# Patient Record
(demographics unavailable — no encounter records)

---

## 2025-06-09 NOTE — ASSESSMENT
[FreeTextEntry1] : Patient has bilateral middle finger trigger digits.  The left side is worse on the right side.  He will end up having surgery done for a left middle finger trigger digit release.  Risk and benefits of surgery got limited to bleeding infection risk injury and stiffness discussed with the patient.  The fact that he has a PIP flexion contracture already was discussed with him as well.  He will see me back at the time of surgical intervention under local anesthesia

## 2025-06-09 NOTE — PHYSICAL EXAM
[de-identified] : Patient has tenderness to palpation A1 pulley left middle finger and right middle finger.  He has a flexion contracture of the PIP joint of the left middle finger.  Less so with the right middle finger.  There is no Dupuytren's present.  No erythema ecchymoses or abrasions.  No masses.

## 2025-06-09 NOTE — HISTORY OF PRESENT ILLNESS
[de-identified] : 78-year-old male comes in history of diabetes has both middle fingers that bother him with pain discomfort and locking.  He has not had a cortisone injections in the past that did not relieve the symptoms.  And he comes in today for evaluation because that is locking that is present.  The left bothers him more than the right.  The patient was having pain and discomfort in his hand that is worse over the last couple of weeks

## 2025-07-02 NOTE — PHYSICAL EXAM
[de-identified] : Physical exam of his his left middle:  Resolving swelling and ecchymosis.  The wound is clean and dry.  No signs of drainage, pus or infection. Stiffness of the 3rd digit.

## 2025-07-02 NOTE — HISTORY OF PRESENT ILLNESS
[de-identified] : Patient is a 78 year M here for his first PO appt. He is status post a left middle finger TFR done by Dr. Croft. He is doing well.

## 2025-07-02 NOTE — DISCUSSION/SUMMARY
[de-identified] : Today I removed the sutures.   The patient will work on range of motion and scar massage.   Swelling of the digit can linger for 4-6 weeks.  He will go to therapy to help with stiffness. He understands his finger may never go straight because it was not straight prior to surgery. He'll see Dr. Croft in a month for ROM check. All questions were answered.